# Patient Record
Sex: FEMALE | Race: WHITE | Employment: FULL TIME | URBAN - METROPOLITAN AREA
[De-identification: names, ages, dates, MRNs, and addresses within clinical notes are randomized per-mention and may not be internally consistent; named-entity substitution may affect disease eponyms.]

---

## 2019-05-03 ENCOUNTER — OFFICE VISIT (OUTPATIENT)
Dept: INTERNAL MEDICINE CLINIC | Age: 59
End: 2019-05-03

## 2019-05-03 VITALS
HEART RATE: 86 BPM | WEIGHT: 123.6 LBS | TEMPERATURE: 98.4 F | BODY MASS INDEX: 21.1 KG/M2 | HEIGHT: 64 IN | RESPIRATION RATE: 16 BRPM | DIASTOLIC BLOOD PRESSURE: 75 MMHG | OXYGEN SATURATION: 97 % | SYSTOLIC BLOOD PRESSURE: 117 MMHG

## 2019-05-03 DIAGNOSIS — S76.011A TEAR OF RIGHT GLUTEUS MINIMUS TENDON, INITIAL ENCOUNTER: ICD-10-CM

## 2019-05-03 DIAGNOSIS — M62.838 TRAPEZIUS MUSCLE SPASM: ICD-10-CM

## 2019-05-03 DIAGNOSIS — M54.2 CERVICALGIA: Primary | ICD-10-CM

## 2019-05-03 DIAGNOSIS — L82.1 SK (SEBORRHEIC KERATOSIS): ICD-10-CM

## 2019-05-03 RX ORDER — VITAMIN E 1000 UNIT
CAPSULE ORAL
COMMUNITY
End: 2021-01-14

## 2019-05-03 RX ORDER — VALACYCLOVIR HYDROCHLORIDE 500 MG/1
TABLET, FILM COATED ORAL 2 TIMES DAILY
COMMUNITY
End: 2020-06-30 | Stop reason: DRUGHIGH

## 2019-05-03 RX ORDER — TRIAMTERENE/HYDROCHLOROTHIAZID 37.5-25 MG
0.5 TABLET ORAL DAILY
COMMUNITY
End: 2020-04-06 | Stop reason: SDUPTHER

## 2019-05-03 RX ORDER — MELOXICAM 7.5 MG/1
TABLET ORAL DAILY
COMMUNITY
End: 2021-02-26

## 2019-05-03 RX ORDER — IPRATROPIUM BROMIDE 21 UG/1
2 SPRAY, METERED NASAL EVERY 12 HOURS
COMMUNITY

## 2019-05-03 RX ORDER — MONTELUKAST SODIUM 10 MG/1
10 TABLET ORAL DAILY
COMMUNITY
End: 2021-01-14

## 2019-05-03 RX ORDER — BISMUTH SUBSALICYLATE 262 MG
1 TABLET,CHEWABLE ORAL DAILY
COMMUNITY
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
## 2019-08-05 ENCOUNTER — OFFICE VISIT (OUTPATIENT)
Dept: INTERNAL MEDICINE CLINIC | Age: 59
End: 2019-08-05

## 2019-08-05 VITALS
WEIGHT: 128.2 LBS | RESPIRATION RATE: 16 BRPM | HEIGHT: 64 IN | OXYGEN SATURATION: 95 % | SYSTOLIC BLOOD PRESSURE: 128 MMHG | HEART RATE: 62 BPM | BODY MASS INDEX: 21.89 KG/M2 | DIASTOLIC BLOOD PRESSURE: 81 MMHG | TEMPERATURE: 98 F

## 2019-08-05 DIAGNOSIS — M62.838 TRAPEZIUS MUSCLE SPASM: ICD-10-CM

## 2019-08-05 DIAGNOSIS — Z13.5 GLAUCOMA SCREENING: ICD-10-CM

## 2019-08-05 DIAGNOSIS — H33.21 RIGHT RETINAL DETACHMENT: ICD-10-CM

## 2019-08-05 DIAGNOSIS — M54.2 CERVICALGIA: Primary | ICD-10-CM

## 2019-08-05 RX ORDER — LIDOCAINE 50 MG/G
PATCH TOPICAL
Qty: 30 EACH | Refills: 0 | Status: SHIPPED | OUTPATIENT
Start: 2019-08-05 | End: 2019-12-03 | Stop reason: ALTCHOICE

## 2019-08-05 RX ORDER — LIDOCAINE 50 MG/G
PATCH TOPICAL
Qty: 30 EACH | Refills: 0 | Status: SHIPPED | OUTPATIENT
Start: 2019-08-05 | End: 2019-08-05 | Stop reason: SDUPTHER

## 2019-08-05 RX ORDER — LIDOCAINE 50 MG/G
PATCH TOPICAL
Qty: 1 EACH | Refills: 0 | Status: SHIPPED | OUTPATIENT
Start: 2019-08-05 | End: 2019-08-05 | Stop reason: DRUGHIGH

## 2019-08-05 NOTE — PROGRESS NOTES
Chief Complaint   Patient presents with    Neck Pain     she is a 59y.o. year old female who presents for follow up of injury. Follow Up Pain Assessment Encounter      Onset of Symptoms: a couple years  ________________________________________________________________________  Description: Pain is now the same but ROM has improved. Pain Scale:(1-10): 6  Duration:  continuous  Radiation: upper back  What makes it better?: OTC meds and rest  What makes it worse?:certain movements and positions  Medications tried: acetaminophen, ibuprofen  Modalities tried: PT         Reviewed and agree with Nurse Note and duplicated in this note. Reviewed PmHx, RxHx, FmHx, SocHx, AllgHx and updated and dated in the chart.     Family History   Problem Relation Age of Onset   [facility] Cancer Mother     Stroke Mother     Cancer Father        Past Medical History:   Diagnosis Date    Arthritis     Hypertension     Retinal tear     Vertigo       Social History     Socioeconomic History    Marital status:      Spouse name: Not on file    Number of children: Not on file    Years of education: Not on file    Highest education level: Not on file   Tobacco Use    Smoking status: Never Smoker    Smokeless tobacco: Never Used   Substance and Sexual Activity    Alcohol use: Yes     Comment: rarely    Drug use: Never    Sexual activity: Yes        Review of Systems - negative except as listed above      Objective:     Vitals:    08/05/19 1521   BP: 128/81   Pulse: 62   Resp: 16   Temp: 98 °F (36.7 °C)   TempSrc: Oral   SpO2: 95%   Weight: 128 lb 3.2 oz (58.2 kg)   Height: 5' 4\" (1.626 m)       Physical Examination: General appearance - alert, well appearing, and in no distress  Back exam - full range of motion, no tenderness, palpable spasm or pain on motion  Neurological - alert, oriented, normal speech, no focal findings or movement disorder noted  Musculoskeletal - Neck -negative Spurling's bilaterally, pain with palpation left upper trap with palpable spasm      Assessment/ Plan:   Diagnoses and all orders for this visit:    1. Cervicalgia  -     MRI CERV SPINE WO CONT; Future    2. Trapezius muscle spasm    3. Right retinal detachment    4. Glaucoma screening        Patient is failed conservative measures which include physical therapy x3 months. Would benefit MRI to rule out any disc herniation    Pathophysiology, recovery and rehabilitation process discussed and questions answered   Counseling for 30 Minutes of the total visit duration   Pictures and figures used as necessary   Provided reassurance   Monitor response to injection   Monitor response to Physical Therapy   Recommend activity modification   Recommend  lower impact activities-walking, Eliptical, [employer], cycling or swimming   Follow up in 4 week(s)  Xray's reviewed - within normal limits        I have discussed the diagnosis with the patient and the intended plan as seen in the above orders. The patient has received an after-visit summary and questions were answered concerning future plans. Medication Side Effects and Warnings were discussed with patient,  Patient Labs were reviewed and or requested, and  Patient Past Records were reviewed and or requested  yes     Pt agrees to call or return to clinic and/or go to closest ER with any worsening of symptoms. This may include, but not limited to increased fever (>100.4) with NSAIDS or Tylenol, increased edema, confusion, rash, worsening of presenting symptoms.

## 2019-08-15 ENCOUNTER — DOCUMENTATION ONLY (OUTPATIENT)
Dept: INTERNAL MEDICINE CLINIC | Age: 59
End: 2019-08-15

## 2019-08-19 ENCOUNTER — HOSPITAL ENCOUNTER (OUTPATIENT)
Dept: MRI IMAGING | Age: 59
Discharge: HOME | End: 2019-08-19
Attending: FAMILY MEDICINE
Payer: COMMERCIAL

## 2019-08-19 DIAGNOSIS — M54.2 CERVICALGIA: ICD-10-CM

## 2019-08-19 PROCEDURE — 72141 MRI NECK SPINE W/O DYE: CPT

## 2019-08-20 NOTE — PROGRESS NOTES
Please notify patient that due to the fact that this was an open MRI and there was a lot of movement the image quality is very low. I would recommend follow-up with a spine surgeon, Dr. Felicita Carrasco for a second opinion on this.

## 2019-12-03 ENCOUNTER — OFFICE VISIT (OUTPATIENT)
Dept: INTERNAL MEDICINE CLINIC | Age: 59
End: 2019-12-03

## 2019-12-03 VITALS
OXYGEN SATURATION: 96 % | DIASTOLIC BLOOD PRESSURE: 66 MMHG | TEMPERATURE: 98.3 F | WEIGHT: 127.7 LBS | HEART RATE: 80 BPM | SYSTOLIC BLOOD PRESSURE: 99 MMHG | HEIGHT: 65 IN | RESPIRATION RATE: 16 BRPM | BODY MASS INDEX: 21.27 KG/M2

## 2019-12-03 DIAGNOSIS — Z00.00 WELL WOMAN EXAM (NO GYNECOLOGICAL EXAM): Primary | ICD-10-CM

## 2019-12-03 PROBLEM — M85.88 OSTEOPENIA OF LUMBAR SPINE: Status: ACTIVE | Noted: 2019-12-03

## 2019-12-03 NOTE — PROGRESS NOTES
Chief Complaint   Patient presents with    Hypertension     she is a 62y.o. year old female who presents for CPE  Complete Physical Exam Questions:        1. Do you follow a low fat diet? yes  2. Are you up to date on your Tdap (<10 years)? Yes  3. Have you ever had a Pneumovax vaccine (>65)? Yes   PCV13 Yes   PPSV23 Yes  4. Have you had Zoster vaccine (>60)? Unknown  5. Have you had the HPV - Gardasil (13- 26)? No  6. Do you follow an exercise program?  yes  7. Do you smoke?  no  8. Do you consider yourself overweight?  no  9. Is there a family history of CAD< age 48? No  10. Is there a family history of Cancer?  no  11. Do you know your Cancer risks? No  12. Have you had a colonoscopy? yes  13. Have you been tested for HIV or other STI's? No HIV testing today(18-66 y/o)? No  14. Have had a bone density scan or DEXA done(>65)? Yes  15. Have you had an EKG performed in the last five years (>50)? No    Other complaints:  Pt who presents for follow up of a pre-existing problem of hypertension. Diet and Lifestyle: generally follows a low fat low cholesterol diet, generally follows a low sodium diet, exercises sporadically, nonsmoker  Home BP Monitoring: is well controlled at home, ranging 120's/80's  Use of agents associated with hypertension: none. Cardiovascular ROS: taking medications as instructed, no medication side effects noted, no TIA's, no chest pain on exertion, no dyspnea on exertion, no swelling of ankles. New concerns: abnormal DEXA scan. Reviewed and agree with Nurse Note and duplicated in this note. Reviewed PmHx, RxHx, FmHx, SocHx, AllgHx and updated and dated in the chart.     Family History   Problem Relation Age of Onset    Cancer Mother     Stroke Mother     Cancer Father        Past Medical History:   Diagnosis Date    Arthritis     Hypertension     Retinal tear     Vertigo       Social History     Socioeconomic History    Marital status:      Spouse name: Not on file    Number of children: Not on file    Years of education: Not on file    Highest education level: Not on file   Tobacco Use    Smoking status: Never Smoker    Smokeless tobacco: Never Used   Substance and Sexual Activity    Alcohol use: Yes     Comment: rarely    Drug use: Never    Sexual activity: Yes        Review of Systems - negative except as listed above      Objective:     Vitals:    12/03/19 1531   BP: 99/66   Pulse: 80   Resp: 16   Temp: 98.3 °F (36.8 °C)   TempSrc: Oral   SpO2: 96%   Weight: 127 lb 11.2 oz (57.9 kg)   Height: 5' 4.5\" (1.638 m)       Physical Examination: General appearance - alert, well appearing, and in no distress  Eyes - pupils equal and reactive, extraocular eye movements intact  Ears - bilateral TM's and external ear canals normal  Nose - normal and patent, no erythema, discharge or polyps  Mouth - mucous membranes moist, pharynx normal without lesions  Neck - supple, no significant adenopathy  Chest - clear to auscultation, no wheezes, rales or rhonchi, symmetric air entry  Heart - normal rate, regular rhythm, normal S1, S2, no murmurs, rubs, clicks or gallops  Abdomen - soft, nontender, nondistended, no masses or organomegaly  Back exam - full range of motion, no tenderness, palpable spasm or pain on motion  Neurological - alert, oriented, normal speech, no focal findings or movement disorder noted  Musculoskeletal - no joint tenderness, deformity or swelling  Extremities - peripheral pulses normal, no pedal edema, no clubbing or cyanosis  Skin - normal coloration and turgor, no rashes, no suspicious skin lesions noted      Assessment/ Plan:   Diagnoses and all orders for this visit:    1. Well woman exam (no gynecological exam)  -     CBC W/O DIFF  -     LIPID PANEL  -     METABOLIC PANEL, COMPREHENSIVE         Labs to be drawn: CBC, CMP, Lipid       I have discussed the diagnosis with the patient and the intended plan as seen in the above orders.   The patient has received an after-visit summary and questions were answered concerning future plans. Medication Side Effects and Warnings were discussed with patient,  Patient Labs were reviewed and or requested, and  Patient Past Records were reviewed and or requested  yes         Pt agrees to call or return to clinic and/or go to closest ER with any worsening of symptoms. This may include, but not limited to increased fever (>100.4) with NSAIDS or Tylenol, increased edema, confusion, rash, worsening of presenting symptoms.

## 2019-12-04 LAB
ALBUMIN SERPL-MCNC: 4.9 G/DL (ref 3.6–4.8)
ALBUMIN/GLOB SERPL: 1.8 {RATIO} (ref 1.2–2.2)
ALP SERPL-CCNC: 90 IU/L (ref 39–117)
ALT SERPL-CCNC: 22 IU/L (ref 0–32)
AST SERPL-CCNC: 23 IU/L (ref 0–40)
BILIRUB SERPL-MCNC: 0.2 MG/DL (ref 0–1.2)
BUN SERPL-MCNC: 19 MG/DL (ref 8–27)
BUN/CREAT SERPL: 21 (ref 12–28)
CALCIUM SERPL-MCNC: 9.8 MG/DL (ref 8.7–10.3)
CHLORIDE SERPL-SCNC: 98 MMOL/L (ref 96–106)
CHOLEST SERPL-MCNC: 209 MG/DL (ref 100–199)
CO2 SERPL-SCNC: 27 MMOL/L (ref 20–29)
CREAT SERPL-MCNC: 0.9 MG/DL (ref 0.57–1)
ERYTHROCYTE [DISTWIDTH] IN BLOOD BY AUTOMATED COUNT: 11.9 % (ref 12.3–15.4)
GLOBULIN SER CALC-MCNC: 2.8 G/DL (ref 1.5–4.5)
GLUCOSE SERPL-MCNC: 77 MG/DL (ref 65–99)
HCT VFR BLD AUTO: 42 % (ref 34–46.6)
HDLC SERPL-MCNC: 76 MG/DL
HGB BLD-MCNC: 14.1 G/DL (ref 11.1–15.9)
LDLC SERPL CALC-MCNC: 118 MG/DL (ref 0–99)
MCH RBC QN AUTO: 31.9 PG (ref 26.6–33)
MCHC RBC AUTO-ENTMCNC: 33.6 G/DL (ref 31.5–35.7)
MCV RBC AUTO: 95 FL (ref 79–97)
PLATELET # BLD AUTO: 278 X10E3/UL (ref 150–450)
POTASSIUM SERPL-SCNC: 4.3 MMOL/L (ref 3.5–5.2)
PROT SERPL-MCNC: 7.7 G/DL (ref 6–8.5)
RBC # BLD AUTO: 4.42 X10E6/UL (ref 3.77–5.28)
SODIUM SERPL-SCNC: 145 MMOL/L (ref 134–144)
TRIGL SERPL-MCNC: 77 MG/DL (ref 0–149)
VLDLC SERPL CALC-MCNC: 15 MG/DL (ref 5–40)
WBC # BLD AUTO: 6.3 X10E3/UL (ref 3.4–10.8)

## 2020-03-26 RX ORDER — TRIAMTERENE/HYDROCHLOROTHIAZID 37.5-25 MG
0.5 TABLET ORAL DAILY
Qty: 45 TAB | Refills: 3 | Status: CANCELLED | OUTPATIENT
Start: 2020-03-26

## 2020-04-06 RX ORDER — TRIAMTERENE/HYDROCHLOROTHIAZID 37.5-25 MG
0.5 TABLET ORAL DAILY
Qty: 30 TAB | Refills: 3 | Status: SHIPPED | OUTPATIENT
Start: 2020-04-06 | End: 2020-11-10

## 2020-06-30 ENCOUNTER — VIRTUAL VISIT (OUTPATIENT)
Dept: INTERNAL MEDICINE CLINIC | Age: 60
End: 2020-06-30

## 2020-06-30 DIAGNOSIS — Z12.11 COLON CANCER SCREENING: ICD-10-CM

## 2020-06-30 DIAGNOSIS — I10 ESSENTIAL HYPERTENSION WITH GOAL BLOOD PRESSURE LESS THAN 130/80: ICD-10-CM

## 2020-06-30 DIAGNOSIS — B02.9 HERPES ZOSTER WITHOUT COMPLICATION: Primary | ICD-10-CM

## 2020-06-30 RX ORDER — VALACYCLOVIR HYDROCHLORIDE 1 G/1
1000 TABLET, FILM COATED ORAL 3 TIMES DAILY
Qty: 21 TAB | Refills: 0 | Status: SHIPPED | OUTPATIENT
Start: 2020-06-30 | End: 2020-07-07

## 2020-06-30 NOTE — PROGRESS NOTES
Sara Florez is a 62 y.o. female who was seen by synchronous (real-time) audio-video technology on 6/30/2020 for Shingles        Assessment & Plan:   Diagnoses and all orders for this visit:    1. Herpes zoster without complication    2. Essential hypertension with goal blood pressure less than 692/76  -     METABOLIC PANEL, COMPREHENSIVE  -     CBC W/O DIFF    3. Colon cancer screening  -     REFERRAL TO GASTROENTEROLOGY    Other orders  -     valACYclovir (VALTREX) 1 gram tablet; Take 1 Tab by mouth three (3) times daily for 7 days. Subjective:     Patient is a 55-year-old female who states that for the last 2 days she has had rash develop on under the right breast on abdomen. Patient states that it started off as small dots which each she thought were insect bites but has since progressed and gotten larger to quarter and dime size patches. Patient states that they resemble previous episodes of shingles. She states pain is about a 5 out of 10 and they are itchy. She has not started any medication for this. They are not crossing over her midline. Patient also is following up for blood pressure. Patient states that she has not checking her blood pressure at home but is taking her medications as directed denies chest pain shortness of breath EMMANUEL. Prior to Admission medications    Medication Sig Start Date End Date Taking? Authorizing Provider   valACYclovir (VALTREX) 1 gram tablet Take 1 Tab by mouth three (3) times daily for 7 days. 6/30/20 7/7/20 Yes Nayeli Johnson MD   triamterene-hydroCHLOROthiazide (MAXZIDE) 37.5-25 mg per tablet Take 0.5 Tabs by mouth daily. 4/6/20   Nayeli Johnson MD   aclidinium bromide (TUDORZA PRESSAIR) 400 mcg/actuation inhaler aclidinium bromide 400 mcg/actuation breath activated powder inhaler   Inhale 1 puff every 12 hours by inhalation route. Provider, Historical   meloxicam (MOBIC) 7.5 mg tablet Take  by mouth daily.     Provider, Historical   montelukast (SINGULAIR) 10 mg tablet Take 10 mg by mouth daily. Provider, Historical   ipratropium (ATROVENT) 0.03 % nasal spray 2 Sprays every twelve (12) hours. Provider, Historical   multivitamin (ONE A DAY) tablet Take 1 Tab by mouth daily. Provider, Historical   ascorbic acid, vitamin C, (VITAMIN C) 1,000 mg tablet Take  by mouth. Provider, Historical   KRILL OIL PO Take  by mouth. Provider, Historical   vit B Cmplx 3-FA-Vit C-Biotin (NEPHRO JAMAL RX) 1- mg-mg-mcg tablet Take 1 Tab by mouth daily. Provider, Historical     Current Outpatient Medications   Medication Sig Dispense Refill    valACYclovir (VALTREX) 1 gram tablet Take 1 Tab by mouth three (3) times daily for 7 days. 21 Tab 0    triamterene-hydroCHLOROthiazide (MAXZIDE) 37.5-25 mg per tablet Take 0.5 Tabs by mouth daily. 30 Tab 3    aclidinium bromide (TUDORZA PRESSAIR) 400 mcg/actuation inhaler aclidinium bromide 400 mcg/actuation breath activated powder inhaler   Inhale 1 puff every 12 hours by inhalation route.  meloxicam (MOBIC) 7.5 mg tablet Take  by mouth daily.  montelukast (SINGULAIR) 10 mg tablet Take 10 mg by mouth daily.  ipratropium (ATROVENT) 0.03 % nasal spray 2 Sprays every twelve (12) hours.  multivitamin (ONE A DAY) tablet Take 1 Tab by mouth daily.  ascorbic acid, vitamin C, (VITAMIN C) 1,000 mg tablet Take  by mouth.  KRILL OIL PO Take  by mouth.  vit B Cmplx 3-FA-Vit C-Biotin (NEPHRO JAMAL RX) 1- mg-mg-mcg tablet Take 1 Tab by mouth daily. No Known Allergies  Past Medical History:   Diagnosis Date    Arthritis     Hypertension     Retinal tear     Vertigo      Past Surgical History:   Procedure Laterality Date    HX HEENT      HX ORTHOPAEDIC      HX TONSILLECTOMY       Family History   Problem Relation Age of Onset    Cancer Mother     Stroke Mother     Cancer Father        Review of Systems   All other systems reviewed and are negative.       Objective: Patient-Reported Vitals 6/30/2020   Patient-Reported Weight 126   Patient-Reported Height 54   Patient-Reported Temperature 98.6        [INSTRUCTIONS:  \"[x]\" Indicates a positive item  \"[]\" Indicates a negative item  -- DELETE ALL ITEMS NOT EXAMINED]    Constitutional: [x] Appears well-developed and well-nourished [x] No apparent distress      [] Abnormal -     Mental status: [x] Alert and awake  [x] Oriented to person/place/time [x] Able to follow commands    [] Abnormal -     Eyes:   EOM    [x]  Normal    [] Abnormal -   Sclera  [x]  Normal    [] Abnormal -          Discharge [x]  None visible   [] Abnormal -     HENT: [x] Normocephalic, atraumatic  [] Abnormal -   [x] Mouth/Throat: Mucous membranes are moist    External Ears [x] Normal  [] Abnormal -    Neck: [x] No visualized mass [] Abnormal -     Pulmonary/Chest: [x] Respiratory effort normal   [x] No visualized signs of difficulty breathing or respiratory distress        [] Abnormal -      Musculoskeletal:   [x] Normal gait with no signs of ataxia         [x] Normal range of motion of neck        [] Abnormal -     Neurological:        [x] No Facial Asymmetry (Cranial nerve 7 motor function) (limited exam due to video visit)          [x] No gaze palsy        [] Abnormal -          Skin:        [x] No significant exanthematous lesions or discoloration noted on facial skin         [] Abnormal -            Psychiatric:       [x] Normal Affect [] Abnormal -        [x] No Hallucinations    Other pertinent observable physical exam findings:-        We discussed the expected course, resolution and complications of the diagnosis(es) in detail. Medication risks, benefits, costs, interactions, and alternatives were discussed as indicated. I advised her to contact the office if her condition worsens, changes or fails to improve as anticipated. She expressed understanding with the diagnosis(es) and plan.        Clara Aparicio, who was evaluated through a patient-initiated, synchronous (real-time) audio-video encounter, and/or her healthcare decision maker, is aware that it is a billable service, with coverage as determined by her insurance carrier. She provided verbal consent to proceed: Yes, and patient identification was verified. It was conducted pursuant to the emergency declaration under the [address] [place] [address], [address] [place] [address] and the [employer] Act. A caregiver was present when appropriate. Ability to conduct physical exam was limited. I was at home. The patient was at home.       Manuel Mcdermott MD

## 2020-08-10 ENCOUNTER — OFFICE VISIT (OUTPATIENT)
Dept: INTERNAL MEDICINE CLINIC | Age: 60
End: 2020-08-10
Payer: COMMERCIAL

## 2020-08-10 VITALS
OXYGEN SATURATION: 97 % | BODY MASS INDEX: 22.43 KG/M2 | HEART RATE: 79 BPM | SYSTOLIC BLOOD PRESSURE: 112 MMHG | HEIGHT: 64 IN | DIASTOLIC BLOOD PRESSURE: 75 MMHG | TEMPERATURE: 98.6 F | RESPIRATION RATE: 14 BRPM | WEIGHT: 131.4 LBS

## 2020-08-10 DIAGNOSIS — Z23 ENCOUNTER FOR IMMUNIZATION: ICD-10-CM

## 2020-08-10 DIAGNOSIS — Z11.59 NEED FOR HEPATITIS C SCREENING TEST: ICD-10-CM

## 2020-08-10 DIAGNOSIS — I10 ESSENTIAL HYPERTENSION WITH GOAL BLOOD PRESSURE LESS THAN 130/80: Primary | ICD-10-CM

## 2020-08-10 DIAGNOSIS — K57.92 DIVERTICULITIS: ICD-10-CM

## 2020-08-10 PROCEDURE — 90471 IMMUNIZATION ADMIN: CPT | Performed by: FAMILY MEDICINE

## 2020-08-10 PROCEDURE — 90750 HZV VACC RECOMBINANT IM: CPT | Performed by: FAMILY MEDICINE

## 2020-08-10 PROCEDURE — 99214 OFFICE O/P EST MOD 30 MIN: CPT | Performed by: FAMILY MEDICINE

## 2020-08-10 NOTE — PATIENT INSTRUCTIONS
Diverticulosis: Care Instructions Your Care Instructions In diverticulosis, pouches called diverticula form in the wall of the large intestine (colon). The pouches do not cause any pain or other symptoms. Most people who have diverticulosis do not know they have it. But the pouches sometimes bleed, and if they become infected, they can cause pain and other symptoms. When this happens, it is called diverticulitis. Diverticula form when pressure pushes the wall of the colon outward at certain weak points. A diet that is too low in fiber can cause diverticula. Follow-up care is a key part of your treatment and safety. Be sure to make and go to all appointments, and call your doctor if you are having problems. It's also a good idea to know your test results and keep a list of the medicines you take. How can you care for yourself at home? · Include fruits, leafy green vegetables, beans, and whole grains in your diet each day. These foods are high in fiber. · Take a fiber supplement, such as Citrucel or Metamucil, every day if needed. Read and follow all instructions on the label. · Drink plenty of fluids, enough so that your urine is light yellow or clear like water. If you have kidney, heart, or liver disease and have to limit fluids, talk with your doctor before you increase the amount of fluids you drink. · Get at least 30 minutes of exercise on most days of the week. Walking is a good choice. You also may want to do other activities, such as running, swimming, cycling, or playing tennis or team sports. · Cut out foods that cause gas, pain, or other symptoms. When should you call for help? Call your doctor now or seek immediate medical care if: 
· You have belly pain. · You pass maroon or very bloody stools. · You have a fever. · You have nausea and vomiting. · You have unusual changes in your bowel movements or abdominal swelling. · You have burning pain when you urinate. · You have abnormal vaginal discharge. · You have shoulder pain. · You have cramping pain that does not get better when you have a bowel movement or pass gas. · You pass gas or stool from your urethra while urinating. Watch closely for changes in your health, and be sure to contact your doctor if you have any problems. Where can you learn more? Go to http://alisha-sherwin.info/ Enter C505 in the search box to learn more about \"Diverticulosis: Care Instructions. \" Current as of: August 12, 2019               Content Version: 12.5 © 5905-7009 [employer]. Care instructions adapted under license by [employer] (which disclaims liability or warranty for this information). If you have questions about a medical condition or this instruction, always ask your healthcare professional. [address] any warranty or liability for your use of this information. Diet details Your diet starts with only clear liquids for a few days. Examples of items allowed on a clear liquid diet include: 
Broth Fruit juices without pulp, such as apple juice Ice chips Ice pops without bits of fruit or fruit pulp Gelatin Water Tea or coffee without cream 
As you start feeling better, your doctor will recommend that you slowly add low-fiber foods. Examples of low-fiber foods include: 
Canned or cooked fruits without skin or seeds Canned or cooked vegetables such as green beans, carrots and potatoes (without the skin) Eggs, fish and poultry Refined white bread Fruit and vegetable juice with no pulp Low-fiber cereals Milk, yogurt and cheese White rice, pasta and noodles Results You should feel better within two or three days of starting the diet and antibiotics. If you haven't started feeling better by then, call your doctor. Also contact your doctor if: You develop a fever Your abdominal pain is worsening You're unable to keep clear liquids down These may indicate a complication that requires hospitalization. Vaccine Information Statement Recombinant Zoster (Shingles) Vaccine: What You Need to Know Many Vaccine Information Statements are available in [place]ic and other languages. See www.immunize.org/vis Hojas de información sobre vacunas están disponibles en español y en muchos otros idiomas. Visite www.immunize.org/vis 1. Why get vaccinated? Recombinant zoster (shingles) vaccine can prevent shingles. Shingles (also called herpes zoster, or just zoster) is a painful skin rash, usually with blisters. In addition to the rash, shingles can cause fever, headache, chills, or upset stomach. More rarely, shingles can lead to pneumonia, hearing problems, blindness, brain inflammation (encephalitis), or death. The most common complication of shingles is long-term nerve pain called postherpetic neuralgia (PHN). PHN occurs in the areas where the shingles rash was, even after the rash clears up. It can last for months or years after the rash goes away. The pain from PHN can be severe and debilitating. About 10 to 18% of people who get shingles will experience PHN. The risk of PHN increases with age. An older adult with shingles is more likely to develop PHN and have longer lasting and more severe pain than a younger person with shingles. Shingles is caused by the varicella zoster virus, the same virus that causes chickenpox. After you have chickenpox, the virus stays in your body and can cause shingles later in life. Shingles cannot be passed from one person to another, but the virus that causes shingles can spread and cause chickenpox in someone who had never had chickenpox or received chickenpox vaccine. 2. Recombinant shingles vaccine Recombinant shingles vaccine provides strong protection against shingles. By preventing shingles, recombinant shingles vaccine also protects against PHN. Recombinant shingles vaccine is the preferred vaccine for the prevention of shingles. However, a different vaccine, live shingles vaccine, may be used in some circumstances. The recombinant shingles vaccine is recommended for adults 50 years and older without serious immune problems. It is given as a two-dose series. This vaccine is also recommended for people who have already gotten another type of shingles vaccine, the live shingles vaccine. There is no live virus in this vaccine. Shingles vaccine may be given at the same time as other vaccines. 3. Talk with your health care provider Tell your vaccine provider if the person getting the vaccine: 
 Has had an allergic reaction after a previous dose of recombinant shingles vaccine, or has any severe, life-threatening allergies.  Is pregnant or breastfeeding.  Is currently experiencing an episode of shingles. In some cases, your health care provider may decide to postpone shingles vaccination to a future visit. People with minor illnesses, such as a cold, may be vaccinated. People who are moderately or severely ill should usually wait until they recover before getting recombinant shingles vaccine. Your health care provider can give you more information. 4. Risks of a vaccine reaction  A sore arm with mild or moderate pain is very common after recombinant shingles vaccine, affecting about 80% of vaccinated people. Redness and swelling can also happen at the site of the injection.  Tiredness, muscle pain, headache, shivering, fever, stomach pain, and nausea happen after vaccination in more than half of people who receive recombinant shingles vaccine. In clinical trials, about 1 out of 6 people who got recombinant zoster vaccine experienced side effects that prevented them from doing regular activities. Symptoms usually went away on their own in 2 to 3 days. You should still get the second dose of recombinant zoster vaccine even if you had one of these reactions after the first dose. People sometimes faint after medical procedures, including vaccination. Tell your provider if you feel dizzy or have vision changes or ringing in the ears. As with any medicine, there is a very remote chance of a vaccine causing a severe allergic reaction, other serious injury, or death. 5. What if there is a serious problem? An allergic reaction could occur after the vaccinated person leaves the clinic. If you see signs of a severe allergic reaction (hives, swelling of the face and throat, difficulty breathing, a fast heartbeat, dizziness, or weakness), call 9-1-1 and get the person to the nearest hospital. 
 
For other signs that concern you, call your health care provider. Adverse reactions should be reported to the Vaccine Adverse Event Reporting System (VAERS). Your health care provider will usually file this report, or you can do it yourself. Visit the VAERS website at www.vaers. [facility].gov or call 8-127.824.1656. VAERS is only for reporting reactions, and VAERS staff do not give medical advice. 6. How can I learn more?  Ask your health care provider.  Call your local or state health department.  Contact the Centers for Disease Control and Prevention (CDC): 
- Call 7-702.496.3544 (1-800-CDC-INFO) or 
- Visit CDCs website at www.cdc.gov/vaccines Vaccine Information Statement Recombinant Zoster Vaccine 10/30/2019 Department of [facility] and [facility] Centers for Disease Control and Prevention Office Use Only

## 2020-08-10 NOTE — PROGRESS NOTES
Chief Complaint   Patient presents with    Hypertension     Pt who presents for follow up of a pre-existing problem of hypertension. Diet and Lifestyle: not attempting to follow a low fat, low cholesterol diet, not attempting to follow a low sodium diet, exercises sporadically, smoker none, caffeine intake coffee 1-2 daily, alcohol intake rarely  Home BP Monitoring: is not measured at home  Use of agents associated with hypertension: Maxzide. Cardiovascular ROS: taking medications as instructed, no medication side effects noted, no TIA's, no chest pain on exertion, no dyspnea on exertion, no swelling of ankles. New concerns: Diverticulitis attack and has colonoscopy for next month. Wants to know what she should or shouldn't eat. Reviewed and agree with Nurse Note and duplicated in this note. Reviewed PmHx, RxHx, FmHx, SocHx, AllgHx and updated and dated in the chart.     Family History   Problem Relation Age of Onset   Cestius.Portia Cancer Mother     Stroke Mother     Cancer Father        Past Medical History:   Diagnosis Date    Arthritis     Hypertension     Retinal tear     Vertigo       Social History     Socioeconomic History    Marital status:      Spouse name: Not on file    Number of children: Not on file    Years of education: Not on file    Highest education level: Not on file   Tobacco Use    Smoking status: Never Smoker    Smokeless tobacco: Never Used   Substance and Sexual Activity    Alcohol use: Yes     Comment: rarely    Drug use: Never    Sexual activity: Yes        Review of Systems - negative except as listed above      Objective:     Vitals:    08/10/20 1609   BP: 112/75   Pulse: 79   Resp: 14   Temp: 98.6 °F (37 °C)   TempSrc: Oral   SpO2: 97%   Weight: 131 lb 6.4 oz (59.6 kg)   Height: 5' 4\" (1.626 m)       Physical Examination: General appearance - alert, well appearing, and in no distress  Eyes - pupils equal and reactive, extraocular eye movements intact  Ears - bilateral TM's and external ear canals normal  Nose - normal and patent, no erythema, discharge or polyps  Mouth - mucous membranes moist, pharynx normal without lesions  Neck - supple, no significant adenopathy  Chest - clear to auscultation, no wheezes, rales or rhonchi, symmetric air entry  Heart - normal rate, regular rhythm, normal S1, S2, no murmurs, rubs, clicks or gallops  Abdomen - soft, nontender, nondistended, no masses or organomegaly  Back exam - full range of motion, no tenderness, palpable spasm or pain on motion  Neurological - alert, oriented, normal speech, no focal findings or movement disorder noted  Musculoskeletal - no joint tenderness, deformity or swelling  Extremities - peripheral pulses normal, no pedal edema, no clubbing or cyanosis  Skin - normal coloration and turgor, no rashes, no suspicious skin lesions noted     Assessment/ Plan:   Diagnoses and all orders for this visit:    1. Essential hypertension with goal blood pressure less than 690/98  -     METABOLIC PANEL, COMPREHENSIVE  -     CBC W/O DIFF    2. Need for hepatitis C screening test  -     HEPATITIS C AB    3. Diverticulitis                 Medication Side Effects and Warnings were discussed with patient,  Patient Labs were reviewed and or requested, and  Patient Past Records were reviewed and or requested  yes       I have discussed the diagnosis with the patient and the intended plan as seen in the above orders. The patient has received an after-visit summary and questions were answered concerning future plans. Pt agrees to call or return to clinic and/or go to closest ER with any worsening of symptoms. This may include, but not limited to increased fever (>100.4) with NSAIDS or Tylenol, increased edema, confusion, rash, worsening of presenting symptoms. Please note that this dictation was completed with [employer], the [employer] voice recognition software.   Quite often unanticipated grammatical, syntax, homophones, and other interpretive errors are inadvertently transcribed by the computer software. Please disregard these errors. Please excuse any errors that have escaped final proofreading. Thank you.

## 2020-08-11 LAB
ERYTHROCYTE [DISTWIDTH] IN BLOOD BY AUTOMATED COUNT: 12 % (ref 11.7–15.4)
HCT VFR BLD AUTO: 37 % (ref 34–46.6)
HGB BLD-MCNC: 12.5 G/DL (ref 11.1–15.9)
MCH RBC QN AUTO: 32.6 PG (ref 26.6–33)
MCHC RBC AUTO-ENTMCNC: 33.8 G/DL (ref 31.5–35.7)
MCV RBC AUTO: 96 FL (ref 79–97)
PLATELET # BLD AUTO: 246 X10E3/UL (ref 150–450)
RBC # BLD AUTO: 3.84 X10E6/UL (ref 3.77–5.28)
WBC # BLD AUTO: 5.6 X10E3/UL (ref 3.4–10.8)

## 2020-08-12 LAB
ALBUMIN SERPL-MCNC: 4.3 G/DL (ref 3.8–4.8)
ALBUMIN/GLOB SERPL: 1.7 {RATIO} (ref 1.2–2.2)
ALP SERPL-CCNC: 76 IU/L (ref 39–117)
ALT SERPL-CCNC: 17 IU/L (ref 0–32)
AST SERPL-CCNC: 19 IU/L (ref 0–40)
BILIRUB SERPL-MCNC: <0.2 MG/DL (ref 0–1.2)
BUN SERPL-MCNC: 20 MG/DL (ref 8–27)
BUN/CREAT SERPL: 27 (ref 12–28)
CALCIUM SERPL-MCNC: 9.8 MG/DL (ref 8.7–10.3)
CHLORIDE SERPL-SCNC: 98 MMOL/L (ref 96–106)
CO2 SERPL-SCNC: 29 MMOL/L (ref 20–29)
CREAT SERPL-MCNC: 0.75 MG/DL (ref 0.57–1)
GLOBULIN SER CALC-MCNC: 2.6 G/DL (ref 1.5–4.5)
GLUCOSE SERPL-MCNC: 102 MG/DL (ref 65–99)
POTASSIUM SERPL-SCNC: 4.1 MMOL/L (ref 3.5–5.2)
PROT SERPL-MCNC: 6.9 G/DL (ref 6–8.5)
SODIUM SERPL-SCNC: 142 MMOL/L (ref 134–144)

## 2020-09-10 ENCOUNTER — HOSPITAL ENCOUNTER (OUTPATIENT)
Dept: CT IMAGING | Age: 60
Discharge: HOME | End: 2020-09-10
Attending: SPECIALIST
Payer: COMMERCIAL

## 2020-09-10 DIAGNOSIS — R10.31 RLQ ABDOMINAL PAIN: ICD-10-CM

## 2020-09-10 DIAGNOSIS — R10.32 LLQ PAIN: ICD-10-CM

## 2020-09-10 DIAGNOSIS — Z83.71 FAMILY HISTORY OF COLONIC POLYPS: ICD-10-CM

## 2020-09-10 PROCEDURE — 74011000258 HC RX REV CODE- 258: Performed by: SPECIALIST

## 2020-09-10 PROCEDURE — 74177 CT ABD & PELVIS W/CONTRAST: CPT

## 2020-09-10 PROCEDURE — 74011000636 HC RX REV CODE- 636: Performed by: SPECIALIST

## 2020-09-10 RX ORDER — SODIUM CHLORIDE 0.9 % (FLUSH) 0.9 %
10 SYRINGE (ML) INJECTION
Status: COMPLETED | OUTPATIENT
Start: 2020-09-10 | End: 2020-09-10

## 2020-09-10 RX ADMIN — IOPAMIDOL 100 ML: 755 INJECTION, SOLUTION INTRAVENOUS at 09:44

## 2020-09-10 RX ADMIN — IOHEXOL 50 ML: 240 INJECTION, SOLUTION INTRATHECAL; INTRAVASCULAR; INTRAVENOUS; ORAL at 09:44

## 2020-09-10 RX ADMIN — SODIUM CHLORIDE 100 ML: 900 INJECTION, SOLUTION INTRAVENOUS at 09:44

## 2020-09-10 RX ADMIN — Medication 10 ML: at 09:44

## 2020-09-29 DIAGNOSIS — R41.3 MEMORY DEFICIT: Primary | ICD-10-CM

## 2020-09-29 RX ORDER — VALACYCLOVIR HYDROCHLORIDE 1 G/1
1000 TABLET, FILM COATED ORAL 3 TIMES DAILY
Qty: 21 TAB | Refills: 0 | Status: SHIPPED | OUTPATIENT
Start: 2020-09-29 | End: 2021-12-21 | Stop reason: SDUPTHER

## 2020-10-13 ENCOUNTER — OFFICE VISIT (OUTPATIENT)
Dept: NEUROLOGY | Facility: CLINIC | Age: 60
End: 2020-10-13
Payer: COMMERCIAL

## 2020-10-13 VITALS
HEART RATE: 84 BPM | WEIGHT: 131 LBS | DIASTOLIC BLOOD PRESSURE: 84 MMHG | HEIGHT: 64 IN | OXYGEN SATURATION: 97 % | BODY MASS INDEX: 22.36 KG/M2 | RESPIRATION RATE: 16 BRPM | SYSTOLIC BLOOD PRESSURE: 122 MMHG

## 2020-10-13 DIAGNOSIS — R41.3 MEMORY LOSS: Primary | ICD-10-CM

## 2020-10-13 DIAGNOSIS — Z81.8 FAMILY HISTORY OF FIRST DEGREE RELATIVE WITH DEMENTIA: ICD-10-CM

## 2020-10-13 PROCEDURE — 99244 OFF/OP CNSLTJ NEW/EST MOD 40: CPT | Performed by: PSYCHIATRY & NEUROLOGY
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
## 2020-10-13 NOTE — PROGRESS NOTES
Ms. Valentina Rodriguez presents today as a new patient for evaluation of memory loss for the past two years. She feels that her symptoms are exacerbated by stress. Depression screening done on this patient.

## 2020-10-13 NOTE — PATIENT INSTRUCTIONS
PRESCRIPTION REFILL POLICY [facility] Statement to Patients April 1, 2014 In an effort to ensure the large volume of patient prescription refills is processed in the most efficient and expeditious manner, we are asking our patients to assist us by calling your Pharmacy for all prescription refills, this will include also your  Mail Order Pharmacy. The pharmacy will contact our office electronically to continue the refill process. Please do not wait until the last minute to call your pharmacy. We need at least 48 hours (2days) to fill prescriptions. We also encourage you to call your pharmacy before going to  your prescription to make sure it is ready. With regard to controlled substance prescription refill requests (narcotic refills) that need to be picked up at our office, we ask your cooperation by providing us with at least 72 hours (3days) notice that you will need a refill. We will not refill narcotic prescription refill requests after 4:00pm on any weekday, Monday through Thursday, or after 2:00pm on Fridays, or on the weekends. We encourage everyone to explore another way of getting your prescription refill request processed using [employer], our patient web portal through our electronic medical record system. [employer] is an efficient and effective way to communicate your medication request directly to the office and  downloadable as an john on your smart phone . [employer] also features a review functionality that allows you to view your medication list as well as leave messages for your physician. Are you ready to get connected? If so please review the attatched instructions or speak to any of our staff to get you set up right away! Thank you so much for your cooperation. Should you have any questions please contact our Practice Administrator. The Physicians and Staff,  [facility]

## 2020-10-14 LAB
FOLATE SERPL-MCNC: 16.9 NG/ML
VIT B12 SERPL-MCNC: 961 PG/ML (ref 232–1245)

## 2020-10-29 ENCOUNTER — HOSPITAL ENCOUNTER (OUTPATIENT)
Dept: MRI IMAGING | Age: 60
Discharge: HOME | End: 2020-10-29
Attending: PSYCHIATRY & NEUROLOGY
Payer: COMMERCIAL

## 2020-10-29 DIAGNOSIS — R41.3 MEMORY LOSS: ICD-10-CM

## 2020-10-29 DIAGNOSIS — Z81.8 FAMILY HISTORY OF FIRST DEGREE RELATIVE WITH DEMENTIA: ICD-10-CM

## 2020-10-29 PROCEDURE — 70551 MRI BRAIN STEM W/O DYE: CPT

## 2020-10-29 NOTE — PROGRESS NOTES
MRI brain looks good. She has some mild age-related changes but nothing serious. No brain atrophy. We have a good baseline scan.

## 2020-11-10 RX ORDER — TRIAMTERENE/HYDROCHLOROTHIAZID 37.5-25 MG
TABLET ORAL
Qty: 30 TAB | Refills: 5 | Status: SHIPPED | OUTPATIENT
Start: 2020-11-10 | End: 2021-11-05 | Stop reason: SDUPTHER

## 2020-12-28 DIAGNOSIS — I49.9 IRREGULAR HEARTBEAT: Primary | ICD-10-CM

## 2021-01-05 ENCOUNTER — DOCUMENTATION ONLY (OUTPATIENT)
Dept: NEUROLOGY | Age: 61
End: 2021-01-05

## 2021-01-05 NOTE — PROGRESS NOTES
I received the neuropsychological testing conducted by Dr. RIVER [facility]. Evaluation completed December 15, 2020. Impressions:  \" Overall impressions of neuropsychological assessment results suggest select areas of compromise when compared to a demographically related peer group.  Formal assessment of emotional functioning suggested mild levels of depressed and anxious mood manifested a sense of pessimism with a loss of pleasure, self criticism, and indecision emotional factors coupled with sleep disturbance and pain factors are contributing at least in part to the patient's day-to-day cognitive struggles. \"      Diagnostic impressions:  Memory disturbance-minimally supported  Adjustment disorder with mixed anxiety depressed mood    Recommendations:  #1 results support a trial of outpatient behavioral health services   #2 result support use of cognitive behavioral compensatory strategies  3. The patient is encouraged to maintain social interactions and leisure interests as well as adequate sleep,  4. Results support ongoing medical follow-up as indicated.   5.  Results support consideration for neuropsychological reevaluation should a change occur

## 2021-01-14 ENCOUNTER — OFFICE VISIT (OUTPATIENT)
Dept: CARDIOLOGY CLINIC | Age: 61
End: 2021-01-14
Payer: COMMERCIAL

## 2021-01-14 ENCOUNTER — CLINICAL SUPPORT (OUTPATIENT)
Dept: CARDIOLOGY CLINIC | Age: 61
End: 2021-01-14
Payer: COMMERCIAL

## 2021-01-14 VITALS
BODY MASS INDEX: 25.57 KG/M2 | SYSTOLIC BLOOD PRESSURE: 110 MMHG | RESPIRATION RATE: 14 BRPM | OXYGEN SATURATION: 99 % | DIASTOLIC BLOOD PRESSURE: 80 MMHG | HEIGHT: 64 IN | WEIGHT: 149.8 LBS | HEART RATE: 90 BPM

## 2021-01-14 DIAGNOSIS — Z82.49 FAMILY HISTORY OF EARLY CAD: ICD-10-CM

## 2021-01-14 DIAGNOSIS — R00.2 PALPITATIONS: Primary | ICD-10-CM

## 2021-01-14 DIAGNOSIS — R00.2 PALPITATION: Primary | ICD-10-CM

## 2021-01-14 DIAGNOSIS — Z86.79 HISTORY OF MITRAL VALVE PROLAPSE: ICD-10-CM

## 2021-01-14 DIAGNOSIS — Z56.6 STRESS AT WORK: ICD-10-CM

## 2021-01-14 DIAGNOSIS — R00.2 PALPITATIONS: ICD-10-CM

## 2021-01-14 DIAGNOSIS — I49.9 IRREGULAR HEART BEATS: ICD-10-CM

## 2021-01-14 DIAGNOSIS — I10 ESSENTIAL HYPERTENSION: ICD-10-CM

## 2021-01-14 DIAGNOSIS — R07.9 CHEST PAIN, UNSPECIFIED TYPE: ICD-10-CM

## 2021-01-14 PROCEDURE — 93225 XTRNL ECG REC<48 HRS REC: CPT | Performed by: INTERNAL MEDICINE

## 2021-01-14 PROCEDURE — 93227 XTRNL ECG REC<48 HR R&I: CPT | Performed by: INTERNAL MEDICINE

## 2021-01-14 PROCEDURE — 99244 OFF/OP CNSLTJ NEW/EST MOD 40: CPT | Performed by: INTERNAL MEDICINE

## 2021-01-14 RX ORDER — CHOLESTYRAMINE 4 G/5.5G
1 POWDER, FOR SUSPENSION ORAL DAILY
COMMUNITY
Start: 2020-12-15

## 2021-01-14 RX ORDER — DILTIAZEM HYDROCHLORIDE 120 MG/1
120 CAPSULE, COATED, EXTENDED RELEASE ORAL
Qty: 30 CAP | Refills: 2 | Status: SHIPPED | OUTPATIENT
Start: 2021-01-14 | End: 2021-04-06

## 2021-01-14 RX ORDER — FAMOTIDINE 20 MG/1
20 TABLET, FILM COATED ORAL 2 TIMES DAILY
COMMUNITY
End: 2022-08-15 | Stop reason: ALTCHOICE

## 2021-01-14 RX ORDER — MINERAL OIL
180 ENEMA (ML) RECTAL DAILY
COMMUNITY
End: 2022-08-15 | Stop reason: ALTCHOICE

## 2021-01-14 SDOH — HEALTH STABILITY - MENTAL HEALTH: OTHER PHYSICAL AND MENTAL STRAIN RELATED TO WORK: Z56.6

## 2021-01-14 NOTE — PROGRESS NOTES
Reason for consult: palptiations  Requesting physician: Dr. Freeman    HPI: Pooja Larios, a 60 y.o. year-old who presents for evaluation of palpitations.    Possible hx of MVP  Hx of true panic attack about every ten years  Last panic attack was in 2018 and stress test was ok at that time   In 2018 she moved here for a career change  In February 2020 her co-worker was fired and she has been doing two jobs for the last year  Working 12-14 hours/day 7 days/week   Had shingles, IBS, GERD  Having palpitations all the time due to high anxiety, comes and goes throughout the day, apple watch tells her that her heart rate is fast  No syncope  Vertigo x 15 years intermittently   On Maxzide x 10 years  Had left sided chest pain after eating a big Mac from [employer], had some sudden left sided chest pain afterwards, admits to acid reflux  No pain radiates to her arm, neck, jaw  Has cervical bone spurs and DJD, has a lot of neck pain   Has dyspnea with anxiety, no PND or orthopnea  Used to walk 3-4 miles/day and she is not walking as much as she used to due to lack of time   No LE edema   Drinks 1-2 cups of coffee day, drinks occasionally soda    We reviewed her various heart concerns today.  We will follow up on her mitral valve prolapse and her palpitations and chest pain will be evaluated with loop monitoring and stress echo.  We had a radha discussion today about her stress level including how much she is working and how that is affecting her heart health.  She is also had at least one episode of what sounds like esophageal spasm in the setting of new IBS and new reflux disease.  Some get a go ahead and add diltiazem to see if that will help with some of her symptoms.  We discussed A. fib and arrhythmias and how they relate to stress as well as stress related to vasospasm and chest pain.  Given the fact that she is having significant symptoms we do need to do testing but is my hope that everything will turn out okay.   Hopefully resolution of her stress issues is on the horizon with some of the positive changes happening at work    Assessment/Plan:  1. Palpitations - will order 48 hour holter monitor to assess for arrhythmias, check TSH/T4, CMP and Magnesium level  2. Hx of mitral valve prolapse - will check with stress echo as below   3. Chest pain - atypical, will order stress echo to assess for ischemia, with family hx of CAD will check fasting lipids  -advised her to begin diltiazem CD 120mg at bedtime for possible spasm   4. HTN - on maxzide for vertigo as well, will check CMP   5. Anxiety - discussed importance of stress management for her heart    Fam Hx: mother had angina and took NTG SL tabs, HTN and HH/GERD and passed from a CVA at age 66, father passed from cancer, siblings - sister with Alzheimer's, brother with dementia  Soc Hx: no tobacco use, rare etoh use     She  has a past medical history of Arthritis, Hypertension, Retinal tear, and Vertigo. Cardiovascular ROS: positive for palpitations and chest pain  Respiratory ROS: no cough, shortness of breath, or wheezing  Neurological ROS: no TIA or stroke symptoms  All other systems negative except as above. PE  Vitals:    01/14/21 0746   BP: 110/80   Pulse: 90   Resp: 14   SpO2: 99%   Weight: 149 lb 12.8 oz (67.9 kg)   Height: 5' 4\" (1.626 m)    Body mass index is 25.71 kg/m².   General appearance - alert, well appearing, and in no distress  Mental status - affect appropriate to mood  Eyes - sclera anicteric, moist mucous membranes  Neck - supple  Lymphatics - not assessed  Chest - clear to auscultation, no wheezes, rales or rhonchi  Heart - normal rate, regular rhythm, normal S1, S2, no murmurs, rubs, clicks or gallops  Abdomen - soft, nontender, nondistended  Back exam - full range of motion, no tenderness  Neurological - cranial nerves II through XII grossly intact, no focal deficit  Musculoskeletal - no muscular tenderness noted, normal strength  Extremities - peripheral pulses normal, no pedal edema  Skin - normal coloration  no rashes    12 lead ECG: NSR    Recent Labs:  Lab Results   Component Value Date/Time    Cholesterol, total 209 (H) 12/03/2019 03:57 PM    HDL Cholesterol 76 12/03/2019 03:57 PM    LDL, calculated 118 (H) 12/03/2019 03:57 PM    Triglyceride 77 12/03/2019 03:57 PM     Lab Results   Component Value Date/Time    Creatinine 0.75 08/10/2020 05:00 PM     Lab Results   Component Value Date/Time    BUN 20 08/10/2020 05:00 PM     Lab Results   Component Value Date/Time    Potassium 4.1 08/10/2020 05:00 PM     No results found for: HBA1C, HGBE8, NYS0NMQL, MWS4SUZL  Lab Results   Component Value Date/Time    HGB 12.5 08/10/2020 05:00 PM     Lab Results   Component Value Date/Time    PLATELET 952 06/07/4405 05:00 PM       Reviewed:  Past Medical History:   Diagnosis Date    Arthritis     Hypertension     Retinal tear     Vertigo      Social History     Tobacco Use   Smoking Status Never Smoker   Smokeless Tobacco Never Used     Social History     Substance and Sexual Activity   Alcohol Use Yes    Frequency: Monthly or less    Drinks per session: 1 or 2    Binge frequency: Never    Comment: rarely     No Known Allergies    Current Outpatient Medications   Medication Sig    famotidine (Pepcid) 20 mg tablet Take 20 mg by mouth two (2) times a day.  fexofenadine (ALLEGRA) 180 mg tablet Take 180 mg by mouth daily.  dilTIAZem ER (CARDIZEM CD) 120 mg capsule Take 1 Cap by mouth nightly.  triamterene-hydroCHLOROthiazide (MAXZIDE) 37.5-25 mg per tablet TAKE ONE-HALF (1/2) TABLET DAILY    valACYclovir (VALTREX) 1 gram tablet Take 1 Tab by mouth three (3) times daily.  ipratropium (ATROVENT) 0.03 % nasal spray 2 Sprays every twelve (12) hours.  multivitamin (ONE A DAY) tablet Take 1 Tab by mouth daily.  KRILL OIL PO Take 1 Cap by mouth daily.     vit B Cmplx 3-FA-Vit C-Biotin (NEPHRO JAMAL RX) 1- mg-mg-mcg tablet Take 1 Tab by mouth daily.  Prevalite 4 gram packet Take 1 Packet by mouth daily.  meloxicam (MOBIC) 7.5 mg tablet Take  by mouth daily. No current facility-administered medications for this visit.         Jean Marie Mcgarry MD  [facility] [address] [place] [address] [address], [address] [address]  [facility]  [phone]

## 2021-01-14 NOTE — PATIENT INSTRUCTIONS
Please have fasting labs drawn at the [facility]    Please reduce your caffeine to 1 cup of coffee daily and eliminate your sodas    Please begin taking diltiazem CD 120mg at bedtime to help with palpitations and chest pain possibly due to spasm    We will contact you about your test results

## 2021-01-15 LAB
ALBUMIN SERPL-MCNC: 4.8 G/DL (ref 3.8–4.8)
ALBUMIN/GLOB SERPL: 1.8 {RATIO} (ref 1.2–2.2)
ALP SERPL-CCNC: 81 IU/L (ref 39–117)
ALT SERPL-CCNC: 12 IU/L (ref 0–32)
AST SERPL-CCNC: 16 IU/L (ref 0–40)
BILIRUB SERPL-MCNC: 0.6 MG/DL (ref 0–1.2)
BUN SERPL-MCNC: 20 MG/DL (ref 8–27)
BUN/CREAT SERPL: 25 (ref 12–28)
CALCIUM SERPL-MCNC: 10.2 MG/DL (ref 8.7–10.3)
CHLORIDE SERPL-SCNC: 97 MMOL/L (ref 96–106)
CHOLEST SERPL-MCNC: 220 MG/DL (ref 100–199)
CO2 SERPL-SCNC: 29 MMOL/L (ref 20–29)
CREAT SERPL-MCNC: 0.79 MG/DL (ref 0.57–1)
GLOBULIN SER CALC-MCNC: 2.6 G/DL (ref 1.5–4.5)
GLUCOSE SERPL-MCNC: 101 MG/DL (ref 65–99)
HDLC SERPL-MCNC: 89 MG/DL
INTERPRETATION, 910389: NORMAL
LDLC SERPL CALC-MCNC: 118 MG/DL (ref 0–99)
MAGNESIUM SERPL-MCNC: 2 MG/DL (ref 1.6–2.3)
POTASSIUM SERPL-SCNC: 4.1 MMOL/L (ref 3.5–5.2)
PROT SERPL-MCNC: 7.4 G/DL (ref 6–8.5)
SODIUM SERPL-SCNC: 139 MMOL/L (ref 134–144)
T4 SERPL-MCNC: 6.9 UG/DL (ref 4.5–12)
TRIGL SERPL-MCNC: 73 MG/DL (ref 0–149)
TSH SERPL DL<=0.005 MIU/L-ACNC: 1.16 UIU/ML (ref 0.45–4.5)
VLDLC SERPL CALC-MCNC: 13 MG/DL (ref 5–40)

## 2021-01-21 ENCOUNTER — ANCILLARY PROCEDURE (OUTPATIENT)
Dept: CARDIOLOGY CLINIC | Age: 61
End: 2021-01-21
Payer: COMMERCIAL

## 2021-01-21 VITALS
SYSTOLIC BLOOD PRESSURE: 110 MMHG | BODY MASS INDEX: 21.85 KG/M2 | DIASTOLIC BLOOD PRESSURE: 90 MMHG | HEIGHT: 64 IN | WEIGHT: 128 LBS

## 2021-01-21 DIAGNOSIS — Z82.49 FAMILY HISTORY OF EARLY CAD: ICD-10-CM

## 2021-01-21 DIAGNOSIS — R07.9 CHEST PAIN, UNSPECIFIED TYPE: ICD-10-CM

## 2021-01-21 PROCEDURE — 93351 STRESS TTE COMPLETE: CPT | Performed by: INTERNAL MEDICINE

## 2021-01-22 LAB
ECHO AO ROOT DIAM: 3.06 CM
ECHO TV REGURGITANT MAX VELOCITY: 202.01 CM/S
ECHO TV REGURGITANT PEAK GRADIENT: 16.32 MMHG
STRESS ANGINA INDEX: 0
STRESS BASELINE DIAS BP: 90 MMHG
STRESS BASELINE HR: 113 BPM
STRESS BASELINE SYS BP: 110 MMHG
STRESS ESTIMATED WORKLOAD: 10.1 METS
STRESS EXERCISE DUR MIN: NORMAL MIN:SEC
STRESS O2 SAT PEAK: 92 %
STRESS PEAK DIAS BP: 90 MMHG
STRESS PEAK SYS BP: 160 MMHG
STRESS PERCENT HR ACHIEVED: 113 %
STRESS POST PEAK HR: 179 BPM
STRESS RATE PRESSURE PRODUCT: NORMAL BPM*MMHG
STRESS SR DUKE TREADMILL SCORE: 8
STRESS ST DEPRESSION: 0 MM
STRESS ST ELEVATION: 0 MM
STRESS TARGET HR: 158 BPM

## 2021-01-25 NOTE — PROGRESS NOTES
Great news- your stress test is normal, your heart is strong and no evidence of blockages was found.

## 2021-02-26 ENCOUNTER — OFFICE VISIT (OUTPATIENT)
Dept: NEUROLOGY | Age: 61
End: 2021-02-26
Payer: COMMERCIAL

## 2021-02-26 VITALS
HEIGHT: 64 IN | OXYGEN SATURATION: 98 % | HEART RATE: 88 BPM | DIASTOLIC BLOOD PRESSURE: 88 MMHG | SYSTOLIC BLOOD PRESSURE: 112 MMHG | BODY MASS INDEX: 21.85 KG/M2 | WEIGHT: 128 LBS

## 2021-02-26 DIAGNOSIS — Z56.6 WORK-RELATED STRESS: ICD-10-CM

## 2021-02-26 DIAGNOSIS — R41.840 ATTENTION AND CONCENTRATION DEFICIT: Primary | ICD-10-CM

## 2021-02-26 DIAGNOSIS — Z81.8 FAMILY HISTORY OF FIRST DEGREE RELATIVE WITH DEMENTIA: ICD-10-CM

## 2021-02-26 DIAGNOSIS — G47.19 EXCESSIVE DAYTIME SLEEPINESS: ICD-10-CM

## 2021-02-26 PROCEDURE — 99214 OFFICE O/P EST MOD 30 MIN: CPT | Performed by: PSYCHIATRY & NEUROLOGY

## 2021-02-26 SDOH — HEALTH STABILITY - MENTAL HEALTH: OTHER PHYSICAL AND MENTAL STRAIN RELATED TO WORK: Z56.6

## 2021-02-26 NOTE — PROGRESS NOTES
Chief Complaint   Patient presents with    Follow-up     memory loss \"doing ok, I have been under alot of stress that should be coming to an end. \"     Visit Vitals  /88 (BP 1 Location: Left upper arm, BP Patient Position: Sitting)   Pulse 88   Ht 5' 4\" (1.626 m)   Wt 128 lb (58.1 kg)   SpO2 98%   BMI 21.97 kg/m²
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
## 2021-04-06 RX ORDER — DILTIAZEM HYDROCHLORIDE 120 MG/1
CAPSULE, COATED, EXTENDED RELEASE ORAL
Qty: 90 CAP | Refills: 2 | Status: SHIPPED | OUTPATIENT
Start: 2021-04-06 | End: 2022-08-15 | Stop reason: ALTCHOICE

## 2021-04-06 NOTE — TELEPHONE ENCOUNTER
Requested Prescriptions     Signed Prescriptions Disp Refills    dilTIAZem ER (CARDIZEM CD) 120 mg capsule 90 Cap 2     Sig: TAKE 1 CAPSULE BY MOUTH EVERY DAY AT NIGHT     Authorizing Provider: Honorio Garcia     Ordering User: Rosanne Meza     Per verbal orders

## 2021-11-05 RX ORDER — TRIAMTERENE/HYDROCHLOROTHIAZID 37.5-25 MG
TABLET ORAL
Qty: 45 TABLET | Refills: 3 | Status: SHIPPED | OUTPATIENT
Start: 2021-11-05 | End: 2022-10-31

## 2021-12-21 RX ORDER — VALACYCLOVIR HYDROCHLORIDE 1 G/1
1000 TABLET, FILM COATED ORAL 3 TIMES DAILY
Qty: 21 TABLET | Refills: 0 | Status: SHIPPED | OUTPATIENT
Start: 2021-12-21 | End: 2022-08-15 | Stop reason: ALTCHOICE

## 2021-12-21 RX ORDER — VALACYCLOVIR HYDROCHLORIDE 1 G/1
1000 TABLET, FILM COATED ORAL 3 TIMES DAILY
Qty: 21 TABLET | Refills: 0 | Status: SHIPPED | OUTPATIENT
Start: 2021-12-21 | End: 2021-12-21 | Stop reason: SDUPTHER

## 2022-03-19 PROBLEM — M85.88 OSTEOPENIA OF LUMBAR SPINE: Status: ACTIVE | Noted: 2019-12-03

## 2022-06-27 ENCOUNTER — TRANSCRIBE ORDER (OUTPATIENT)
Dept: SCHEDULING | Age: 62
End: 2022-06-27

## 2022-06-27 DIAGNOSIS — M47.816 LUMBAR SPONDYLOSIS: Primary | ICD-10-CM

## 2022-07-18 ENCOUNTER — HOSPITAL ENCOUNTER (OUTPATIENT)
Dept: MRI IMAGING | Age: 62
Discharge: HOME | End: 2022-07-18
Payer: COMMERCIAL

## 2022-07-18 DIAGNOSIS — M47.816 LUMBAR SPONDYLOSIS: ICD-10-CM

## 2022-07-18 PROCEDURE — 72141 MRI NECK SPINE W/O DYE: CPT

## 2022-07-18 PROCEDURE — 72146 MRI CHEST SPINE W/O DYE: CPT

## 2022-07-18 PROCEDURE — 72148 MRI LUMBAR SPINE W/O DYE: CPT

## 2022-08-15 ENCOUNTER — OFFICE VISIT (OUTPATIENT)
Dept: INTERNAL MEDICINE CLINIC | Age: 62
End: 2022-08-15
Payer: COMMERCIAL

## 2022-08-15 VITALS
HEART RATE: 71 BPM | TEMPERATURE: 98.1 F | BODY MASS INDEX: 23.12 KG/M2 | OXYGEN SATURATION: 98 % | DIASTOLIC BLOOD PRESSURE: 61 MMHG | RESPIRATION RATE: 16 BRPM | HEIGHT: 64 IN | WEIGHT: 135.4 LBS | SYSTOLIC BLOOD PRESSURE: 109 MMHG

## 2022-08-15 DIAGNOSIS — Z00.00 WELL WOMAN EXAM (NO GYNECOLOGICAL EXAM): Primary | ICD-10-CM

## 2022-08-15 DIAGNOSIS — Z12.31 ENCOUNTER FOR SCREENING MAMMOGRAM FOR MALIGNANT NEOPLASM OF BREAST: ICD-10-CM

## 2022-08-15 DIAGNOSIS — I10 ESSENTIAL HYPERTENSION WITH GOAL BLOOD PRESSURE LESS THAN 130/80: ICD-10-CM

## 2022-08-15 DIAGNOSIS — Z11.59 NEED FOR HEPATITIS C SCREENING TEST: ICD-10-CM

## 2022-08-15 PROCEDURE — 99396 PREV VISIT EST AGE 40-64: CPT | Performed by: FAMILY MEDICINE

## 2022-08-15 PROCEDURE — 99213 OFFICE O/P EST LOW 20 MIN: CPT | Performed by: FAMILY MEDICINE

## 2022-08-15 RX ORDER — ALENDRONATE SODIUM 70 MG/1
TABLET ORAL
COMMUNITY
Start: 2022-06-17

## 2022-08-15 NOTE — PROGRESS NOTES
Chief Complaint   Patient presents with    Complete Physical     Patient is here for a wellness visit      she is a 59y.o. year old female who presents for CPE  Complete Physical Exam Questions:    LMP -  n/a  Last Pap (q 3 years, or q5 with HPV) - 10/21  Last Mammogram (50-74 biennially)- 10/21  Hx of abnl Pap - No    1. Do you follow a low fat diet? yes  2. Are you up to date on your Tdap (<10 years)? Yes  3. Have you ever had a Pneumovax vaccine (>65)? No   PCV13 No   PPSV23 No  4. Have you had Zoster vaccine (>60)? No  5. Have you had the HPV - Gardasil (13- 26)? Not applicable  6. Do you follow an exercise program?  yes  7. Do you smoke?  no  8. Do you consider yourself overweight?  no  9. Is there a family history of CAD< age 48? No  10. Is there a family history of Cancer?  yes  11. Do you know your Cancer risks? Yes  12. Have you had a colonoscopy? yes  13. Have you been tested for HIV or other STI's? No HIV testing today(18-66 y/o)? No  14. Have had a bone density scan or DEXA done(>65)? Yes  15. Have you had an EKG performed in the last five years (>50)? Yes    Other complaints:    Reviewed and agree with Nurse Note and duplicated in this note. Reviewed PmHx, RxHx, FmHx, SocHx, AllgHx and updated and dated in the chart.     Family History   Problem Relation Age of Onset    Cancer Mother     Stroke Mother     Cancer Father        Past Medical History:   Diagnosis Date    Arthritis     Hypertension     Retinal tear     Vertigo       Social History     Socioeconomic History    Marital status:    Tobacco Use    Smoking status: Never    Smokeless tobacco: Never   Vaping Use    Vaping Use: Never used   Substance and Sexual Activity    Alcohol use: Yes     Comment: rarely    Drug use: Never    Sexual activity: Yes        Review of Systems - negative except as listed above      Objective:     Vitals:    08/15/22 1456   BP: 109/61   Pulse: 71   Resp: 16   Temp: 98.1 °F (36.7 °C)   SpO2: 98%   Weight: 135 lb 6.4 oz (61.4 kg)   Height: 5' 4\" (1.626 m)       Physical Examination: General appearance - alert, well appearing, and in no distress  Eyes - pupils equal and reactive, extraocular eye movements intact  Ears - bilateral TM's and external ear canals normal  Nose - normal and patent, no erythema, discharge or polyps  Mouth - mucous membranes moist, pharynx normal without lesions  Neck - supple, no significant adenopathy  Chest - clear to auscultation, no wheezes, rales or rhonchi, symmetric air entry  Heart - normal rate, regular rhythm, normal S1, S2, no murmurs, rubs, clicks or gallops  Abdomen - soft, nontender, nondistended, no masses or organomegaly  Musculoskeletal - no joint tenderness, deformity or swelling  Extremities - peripheral pulses normal, no pedal edema, no clubbing or cyanosis  Skin - normal coloration and turgor, no rashes, no suspicious skin lesions noted      Assessment/ Plan:   Diagnoses and all orders for this visit:    1. Well woman exam (no gynecological exam)  -     CBC W/O DIFF; Future  -     LIPID PANEL; Future  -     TSH 3RD GENERATION; Future  -     HEMOGLOBIN A1C WITH EAG; Future    2. Need for hepatitis C screening test  -     HEPATITIS C AB; Future    3. Encounter for screening mammogram for malignant neoplasm of breast  -     CLIFF MAMMO BI SCREENING INCL CAD; Future         Labs to be drawn: CBC, CMP, Lipid            I have discussed the diagnosis with the patient and the intended plan as seen in the above orders. The patient has received an after-visit summary and questions were answered concerning future plans.    Medication Side Effects and Warnings were discussed with patient,  Patient Labs were reviewed and or requested, and  Patient Past Records were reviewed and or requested  yes     Chief Complaint   Patient presents with    Complete Physical     Patient is here for a wellness visit      Pt who presents for follow up of a pre-existing problem of hypertension. Diet and Lifestyle: generally follows a low fat low cholesterol diet  Home BP Monitoring: is not measured at home  Use of agents associated with hypertension: none. Cardiovascular ROS: taking medications as instructed, no medication side effects noted, no TIA's, no chest pain on exertion, no dyspnea on exertion, no swelling of ankles. Reviewed and agree with Nurse Note and duplicated in this note. Reviewed PmHx, RxHx, FmHx, SocHx, AllgHx and updated and dated in the chart.     Family History   Problem Relation Age of Onset    Cancer Mother     Stroke Mother     Cancer Father        Past Medical History:   Diagnosis Date    Arthritis     Hypertension     Retinal tear     Vertigo       Social History     Socioeconomic History    Marital status:    Tobacco Use    Smoking status: Never    Smokeless tobacco: Never   Vaping Use    Vaping Use: Never used   Substance and Sexual Activity    Alcohol use: Yes     Comment: rarely    Drug use: Never    Sexual activity: Yes        Review of Systems - negative except as listed above      Objective:     Vitals:    08/15/22 1456   BP: 109/61   Pulse: 71   Resp: 16   Temp: 98.1 °F (36.7 °C)   SpO2: 98%   Weight: 135 lb 6.4 oz (61.4 kg)   Height: 5' 4\" (1.626 m)       Physical Examination: General appearance - alert, well appearing, and in no distress  Eyes - pupils equal and reactive, extraocular eye movements intact  Ears - bilateral TM's and external ear canals normal  Nose - normal and patent, no erythema, discharge or polyps  Mouth - mucous membranes moist, pharynx normal without lesions  Neck - supple, no significant adenopathy  Chest - clear to auscultation, no wheezes, rales or rhonchi, symmetric air entry  Heart - normal rate, regular rhythm, normal S1, S2, no murmurs, rubs, clicks or gallops  Abdomen - soft, nontender, nondistended, no masses or organomegaly  Extremities - peripheral pulses normal, no pedal edema, no clubbing or cyanosis  Skin - normal coloration and turgor, no rashes, no suspicious skin lesions noted     Assessment/ Plan:   Diagnoses and all orders for this visit:    1. Well woman exam (no gynecological exam)  -     CBC W/O DIFF; Future  -     LIPID PANEL; Future  -     TSH 3RD GENERATION; Future  -     HEMOGLOBIN A1C WITH EAG; Future    2. Need for hepatitis C screening test  -     HEPATITIS C AB; Future    3. Encounter for screening mammogram for malignant neoplasm of breast  -     CLIFF MAMMO BI SCREENING INCL CAD; Future    4. Essential hypertension with goal blood pressure less than 130/80     Blood pressure stable, continue with current treatment    Follow-up and Dispositions    Return in about 6 months (around 2/15/2023) for Telemedicine Follow up, HTN. Medication Side Effects and Warnings were discussed with patient,  Patient Labs were reviewed and or requested, and  Patient Past Records were reviewed and or requested  yes       I have discussed the diagnosis with the patient and the intended plan as seen in the above orders. The patient has received an after-visit summary and questions were answered concerning future plans. Pt agrees to call or return to clinic and/or go to closest ER with any worsening of symptoms. This may include, but not limited to increased fever (>100.4) with NSAIDS or Tylenol, increased edema, confusion, rash, worsening of presenting symptoms. Please note that this dictation was completed with [employer], the computer voice recognition software. Quite often unanticipated grammatical, syntax, homophones, and other interpretive errors are inadvertently transcribed by the computer software. Please disregard these errors. Please excuse any errors that have escaped final proofreading. Thank you.

## 2022-08-16 LAB
CHOLEST SERPL-MCNC: 227 MG/DL
ERYTHROCYTE [DISTWIDTH] IN BLOOD BY AUTOMATED COUNT: 13.3 % (ref 11.5–14.5)
EST. AVERAGE GLUCOSE BLD GHB EST-MCNC: 100 MG/DL
HBA1C MFR BLD: 5.1 % (ref 4–5.6)
HCT VFR BLD AUTO: 43 % (ref 35–47)
HCV AB SERPL QL IA: NONREACTIVE
HDLC SERPL-MCNC: 84 MG/DL
HDLC SERPL: 2.7 {RATIO} (ref 0–5)
HGB BLD-MCNC: 14 G/DL (ref 11.5–16)
LDLC SERPL CALC-MCNC: 113.8 MG/DL (ref 0–100)
MCH RBC QN AUTO: 32.6 PG (ref 26–34)
MCHC RBC AUTO-ENTMCNC: 32.6 G/DL (ref 30–36.5)
MCV RBC AUTO: 100.2 FL (ref 80–99)
NRBC # BLD: 0 K/UL (ref 0–0.01)
NRBC BLD-RTO: 0 PER 100 WBC
PLATELET # BLD AUTO: 228 K/UL (ref 150–400)
PMV BLD AUTO: 11.1 FL (ref 8.9–12.9)
RBC # BLD AUTO: 4.29 M/UL (ref 3.8–5.2)
TRIGL SERPL-MCNC: 146 MG/DL (ref ?–150)
TSH SERPL DL<=0.05 MIU/L-ACNC: 1.4 UIU/ML (ref 0.36–3.74)
VLDLC SERPL CALC-MCNC: 29.2 MG/DL
WBC # BLD AUTO: 6.3 K/UL (ref 3.6–11)

## 2022-08-16 NOTE — PROGRESS NOTES
Your \"Bad\" cholesterol (LDL and/or triglycerides) are elevated. Please eat a healthier diet as described below. In particular avoid fried, fatty and junk foods, while increasing fiber (fruits and vegetables). If you cannot increase fiber through diet, you can supplement with metamucil as directed on bottle daily. Also, make sure you are taking 1 to 2 grams of over the counter fish oil. Increase exercise to 5 times per week of cardio lasting at least 30 min's each (biking, walking, elliptical, swimming). Lets recheck the fasting (atleast eight hours) in 6 months. Mediterranean diet: Choose this heart-healthy diet option  The Mediterranean diet is a heart-healthy eating plan combining elements of Mediterranean-style cooking. Here's how to adopt the Mediterranean diet. If you're looking for a heart-healthy eating plan, the Mediterranean diet might be right for you. The Mediterranean diet incorporates the basics of healthy eating - plus a splash of flavorful olive oil and perhaps a glass of red wine - among other components characterizing the traditional cooking style of countries bordering the [facility]. Most healthy diets include fruits, vegetables, fish and whole grains, and limit unhealthy fats. While these parts of a healthy diet remain tried-and-true, subtle variations or differences in proportions of certain foods may make a difference in your risk of heart disease. Benefits of the [address] has shown that the traditional Mediterranean diet reduces the risk of heart disease. In fact, a recent analysis of more than 1.5 million healthy adults demonstrated that following a Mediterranean diet was associated with a reduced risk of overall and cardiovascular mortality, a reduced incidence of cancer and cancer mortality, and a reduced incidence of Parkinson's and Alzheimer's diseases.    For this reason, most if not all major scientific organizations encourage healthy adults to adapt a style of eating like that of the [address] [facility] for prevention of major chronic diseases. Key components of the Mediterranean diet  The Mediterranean diet emphasizes:   Getting plenty of exercise   Eating primarily plant-based foods, such as fruits and vegetables, whole grains, legumes and nuts   Replacing butter with healthy fats such as olive oil and canola oil   Using herbs and spices instead of salt to flavor foods   Limiting red meat to no more than a few times a month   Eating fish and poultry at least twice a week   Drinking red wine in moderation (optional)   The diet also recognizes the importance of enjoying meals with family and friends. Fruits, vegetables, nuts and grains  The Mediterranean diet traditionally includes fruits, vegetables, pasta and rice. For example, residents of [facility] eat very little red meat and average nine servings a day of antioxidant-rich fruits and vegetables. The Mediterranean diet has been associated with a lower level of oxidized low-density lipoprotein (LDL) cholesterol - the \"bad\" cholesterol that's more likely to build up deposits in your arteries. Nuts are another part of a healthy Mediterranean diet. Nuts are high in fat (approximately 80 percent of their calories come from fat), but most of the fat is not saturated. Because nuts are high in calories, they should not be eaten in large amounts - generally no more than a handful a day. For the best nutrition, avoid candied or honey-roasted and heavily salted nuts. Grains in the [address] [place] [address] region are typically whole grain and usually contain very few unhealthy trans fats, and bread is an important part of the diet there. However, throughout the [address] [place] [address] region, bread is eaten plain or dipped in olive oil - not eaten with butter or margarines, which contain saturated or trans fats.

## 2022-09-08 ENCOUNTER — TELEPHONE (OUTPATIENT)
Dept: NEUROLOGY | Age: 62
End: 2022-09-08

## 2022-10-31 RX ORDER — TRIAMTERENE/HYDROCHLOROTHIAZID 37.5-25 MG
TABLET ORAL
Qty: 45 TABLET | Refills: 3 | Status: SHIPPED | OUTPATIENT
Start: 2022-10-31

## 2022-12-06 ENCOUNTER — OFFICE VISIT (OUTPATIENT)
Dept: NEUROLOGY | Age: 62
End: 2022-12-06
Payer: COMMERCIAL

## 2022-12-06 VITALS
WEIGHT: 128 LBS | HEART RATE: 86 BPM | DIASTOLIC BLOOD PRESSURE: 80 MMHG | BODY MASS INDEX: 21.85 KG/M2 | SYSTOLIC BLOOD PRESSURE: 118 MMHG | HEIGHT: 64 IN | OXYGEN SATURATION: 98 %

## 2022-12-06 DIAGNOSIS — R47.89 WORD FINDING DIFFICULTY: ICD-10-CM

## 2022-12-06 DIAGNOSIS — R41.840 ATTENTION AND CONCENTRATION DEFICIT: Primary | ICD-10-CM

## 2022-12-06 PROCEDURE — 99214 OFFICE O/P EST MOD 30 MIN: CPT | Performed by: PSYCHIATRY & NEUROLOGY

## 2022-12-06 NOTE — PROGRESS NOTES
Chief Complaint   Patient presents with    Follow-up     Here to discuss family history of Dementia     Visit Vitals  /80 (BP 1 Location: Left upper arm, BP Patient Position: Sitting)   Pulse 86   Ht 5' 4\" (1.626 m)   Wt 128 lb (58.1 kg)   SpO2 98%   BMI 21.97 kg/m²
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
## 2023-05-21 RX ORDER — IPRATROPIUM BROMIDE 21 UG/1
2 SPRAY, METERED NASAL EVERY 12 HOURS
COMMUNITY

## 2023-05-21 RX ORDER — ALENDRONATE SODIUM 70 MG/1
TABLET ORAL
COMMUNITY
Start: 2022-06-17

## 2023-05-21 RX ORDER — TRIAMTERENE AND HYDROCHLOROTHIAZIDE 37.5; 25 MG/1; MG/1
0.5 TABLET ORAL DAILY
COMMUNITY
Start: 2022-10-31

## 2023-05-21 RX ORDER — CHOLESTYRAMINE LIGHT 4 G/5.7G
1 POWDER, FOR SUSPENSION ORAL DAILY
COMMUNITY
Start: 2020-12-15

## 2023-10-26 RX ORDER — TRIAMTERENE AND HYDROCHLOROTHIAZIDE 37.5; 25 MG/1; MG/1
0.5 TABLET ORAL DAILY
Qty: 45 TABLET | Refills: 3 | OUTPATIENT
Start: 2023-10-26